# Patient Record
Sex: MALE | Race: WHITE | NOT HISPANIC OR LATINO | Employment: FULL TIME | URBAN - METROPOLITAN AREA
[De-identification: names, ages, dates, MRNs, and addresses within clinical notes are randomized per-mention and may not be internally consistent; named-entity substitution may affect disease eponyms.]

---

## 2024-05-14 ENCOUNTER — APPOINTMENT (OUTPATIENT)
Dept: CARDIOLOGY | Facility: HOSPITAL | Age: 34
End: 2024-05-14
Payer: COMMERCIAL

## 2024-05-14 ENCOUNTER — HOSPITAL ENCOUNTER (EMERGENCY)
Facility: HOSPITAL | Age: 34
Discharge: HOME | End: 2024-05-14
Attending: EMERGENCY MEDICINE
Payer: COMMERCIAL

## 2024-05-14 VITALS
DIASTOLIC BLOOD PRESSURE: 109 MMHG | SYSTOLIC BLOOD PRESSURE: 184 MMHG | HEIGHT: 71 IN | WEIGHT: 220 LBS | TEMPERATURE: 98.2 F | HEART RATE: 86 BPM | OXYGEN SATURATION: 97 % | RESPIRATION RATE: 18 BRPM | BODY MASS INDEX: 30.8 KG/M2

## 2024-05-14 DIAGNOSIS — R45.851 SUICIDAL IDEATION: Primary | ICD-10-CM

## 2024-05-14 LAB
ALBUMIN SERPL BCP-MCNC: 4.6 G/DL (ref 3.4–5)
ALP SERPL-CCNC: 67 U/L (ref 33–120)
ALT SERPL W P-5'-P-CCNC: 16 U/L (ref 10–52)
ANION GAP SERPL CALC-SCNC: 12 MMOL/L (ref 10–20)
APAP SERPL-MCNC: <10 UG/ML
AST SERPL W P-5'-P-CCNC: 16 U/L (ref 9–39)
BASOPHILS # BLD AUTO: 0.02 X10*3/UL (ref 0–0.1)
BASOPHILS NFR BLD AUTO: 0.3 %
BILIRUB SERPL-MCNC: 0.4 MG/DL (ref 0–1.2)
BUN SERPL-MCNC: 13 MG/DL (ref 6–23)
CALCIUM SERPL-MCNC: 9.5 MG/DL (ref 8.6–10.3)
CHLORIDE SERPL-SCNC: 104 MMOL/L (ref 98–107)
CO2 SERPL-SCNC: 26 MMOL/L (ref 21–32)
CREAT SERPL-MCNC: 0.66 MG/DL (ref 0.5–1.3)
EGFRCR SERPLBLD CKD-EPI 2021: >90 ML/MIN/1.73M*2
EOSINOPHIL # BLD AUTO: 0.04 X10*3/UL (ref 0–0.7)
EOSINOPHIL NFR BLD AUTO: 0.7 %
ERYTHROCYTE [DISTWIDTH] IN BLOOD BY AUTOMATED COUNT: 12.6 % (ref 11.5–14.5)
ETHANOL SERPL-MCNC: <10 MG/DL
GLUCOSE SERPL-MCNC: 100 MG/DL (ref 74–99)
HCT VFR BLD AUTO: 45.1 % (ref 41–52)
HGB BLD-MCNC: 15.3 G/DL (ref 13.5–17.5)
IMM GRANULOCYTES # BLD AUTO: 0.05 X10*3/UL (ref 0–0.7)
IMM GRANULOCYTES NFR BLD AUTO: 0.8 % (ref 0–0.9)
LYMPHOCYTES # BLD AUTO: 1.75 X10*3/UL (ref 1.2–4.8)
LYMPHOCYTES NFR BLD AUTO: 28.7 %
MCH RBC QN AUTO: 31.5 PG (ref 26–34)
MCHC RBC AUTO-ENTMCNC: 33.9 G/DL (ref 32–36)
MCV RBC AUTO: 93 FL (ref 80–100)
MONOCYTES # BLD AUTO: 0.61 X10*3/UL (ref 0.1–1)
MONOCYTES NFR BLD AUTO: 10 %
NEUTROPHILS # BLD AUTO: 3.62 X10*3/UL (ref 1.2–7.7)
NEUTROPHILS NFR BLD AUTO: 59.5 %
NRBC BLD-RTO: 0 /100 WBCS (ref 0–0)
PLATELET # BLD AUTO: 294 X10*3/UL (ref 150–450)
POTASSIUM SERPL-SCNC: 3.9 MMOL/L (ref 3.5–5.3)
PROT SERPL-MCNC: 7.4 G/DL (ref 6.4–8.2)
RBC # BLD AUTO: 4.85 X10*6/UL (ref 4.5–5.9)
SALICYLATES SERPL-MCNC: <3 MG/DL
SARS-COV-2 RNA RESP QL NAA+PROBE: NOT DETECTED
SODIUM SERPL-SCNC: 138 MMOL/L (ref 136–145)
WBC # BLD AUTO: 6.1 X10*3/UL (ref 4.4–11.3)

## 2024-05-14 PROCEDURE — 99285 EMERGENCY DEPT VISIT HI MDM: CPT | Mod: 25

## 2024-05-14 PROCEDURE — 84460 ALANINE AMINO (ALT) (SGPT): CPT | Performed by: EMERGENCY MEDICINE

## 2024-05-14 PROCEDURE — 93005 ELECTROCARDIOGRAM TRACING: CPT

## 2024-05-14 PROCEDURE — 36415 COLL VENOUS BLD VENIPUNCTURE: CPT | Performed by: EMERGENCY MEDICINE

## 2024-05-14 PROCEDURE — 85025 COMPLETE CBC W/AUTO DIFF WBC: CPT | Performed by: EMERGENCY MEDICINE

## 2024-05-14 PROCEDURE — 93010 ELECTROCARDIOGRAM REPORT: CPT | Performed by: EMERGENCY MEDICINE

## 2024-05-14 PROCEDURE — 80143 DRUG ASSAY ACETAMINOPHEN: CPT | Performed by: EMERGENCY MEDICINE

## 2024-05-14 PROCEDURE — 99285 EMERGENCY DEPT VISIT HI MDM: CPT | Performed by: EMERGENCY MEDICINE

## 2024-05-14 PROCEDURE — 87635 SARS-COV-2 COVID-19 AMP PRB: CPT | Performed by: EMERGENCY MEDICINE

## 2024-05-14 RX ORDER — HYDROXYZINE HYDROCHLORIDE 25 MG/1
25 TABLET, FILM COATED ORAL EVERY 6 HOURS PRN
Qty: 12 TABLET | Refills: 0 | Status: SHIPPED | OUTPATIENT
Start: 2024-05-14 | End: 2024-05-17

## 2024-05-14 SDOH — HEALTH STABILITY: MENTAL HEALTH: NEEDS EXPRESSED: DENIES

## 2024-05-14 SDOH — HEALTH STABILITY: MENTAL HEALTH: ACTIVE SUICIDAL IDEATION WITH SOME INTENT TO ACT, WITHOUT SPECIFIC PLAN (PAST 1 MONTH): NO

## 2024-05-14 SDOH — HEALTH STABILITY: MENTAL HEALTH: HAVE YOU ACTUALLY HAD ANY THOUGHTS OF KILLING YOURSELF?: YES

## 2024-05-14 SDOH — HEALTH STABILITY: MENTAL HEALTH
HAVE YOU STARTED TO WORK OUT OR WORKED OUT THE DETAILS OF HOW TO KILL YOURSELF? DO YOU INTENT TO CARRY OUT THIS PLAN?: NO

## 2024-05-14 SDOH — HEALTH STABILITY: MENTAL HEALTH: HAVE YOU HAD THESE THOUGHTS AND HAD SOME INTENTION OF ACTING ON THEM?: NO

## 2024-05-14 SDOH — HEALTH STABILITY: MENTAL HEALTH: IN THE PAST FEW WEEKS, HAVE YOU WISHED YOU WERE DEAD?: YES

## 2024-05-14 SDOH — HEALTH STABILITY: MENTAL HEALTH: HAVE YOU BEEN THINKING ABOUT HOW YOU MIGHT DO THIS?: NO

## 2024-05-14 SDOH — HEALTH STABILITY: MENTAL HEALTH: ACTIVE SUICIDAL IDEATION WITH SPECIFIC PLAN AND INTENT (PAST 1 MONTH): NO

## 2024-05-14 SDOH — HEALTH STABILITY: MENTAL HEALTH: HAVE YOU WISHED YOU WERE DEAD OR WISHED YOU COULD GO TO SLEEP AND NOT WAKE UP?: YES

## 2024-05-14 SDOH — SOCIAL STABILITY: SOCIAL NETWORK: EMOTIONAL SUPPORT GIVEN: REASSURE

## 2024-05-14 SDOH — HEALTH STABILITY: MENTAL HEALTH: ANXIETY SYMPTOMS: GENERALIZED;PANIC ATTACK

## 2024-05-14 SDOH — HEALTH STABILITY: MENTAL HEALTH: IN THE PAST FEW WEEKS, HAVE YOU FELT THAT YOU OR YOUR FAMILY WOULD BE BETTER OFF IF YOU WERE DEAD?: YES

## 2024-05-14 SDOH — HEALTH STABILITY: MENTAL HEALTH: NON-SPECIFIC ACTIVE SUICIDAL THOUGHTS (PAST 1 MONTH): YES

## 2024-05-14 SDOH — HEALTH STABILITY: MENTAL HEALTH: BEHAVIORS/MOOD: FLAT AFFECT

## 2024-05-14 SDOH — HEALTH STABILITY: MENTAL HEALTH: SUICIDAL BEHAVIOR (LIFETIME): NO

## 2024-05-14 SDOH — HEALTH STABILITY: MENTAL HEALTH: DEPRESSION SYMPTOMS: FEELINGS OF HELPLESSNESS

## 2024-05-14 SDOH — HEALTH STABILITY: MENTAL HEALTH: IN THE PAST WEEK, HAVE YOU BEEN HAVING THOUGHTS ABOUT KILLING YOURSELF?: NO

## 2024-05-14 SDOH — ECONOMIC STABILITY: HOUSING INSECURITY: FEELS SAFE LIVING IN HOME: YES

## 2024-05-14 SDOH — HEALTH STABILITY: MENTAL HEALTH: HAVE YOU EVER TRIED TO KILL YOURSELF?: NO

## 2024-05-14 SDOH — HEALTH STABILITY: MENTAL HEALTH: ARE YOU HAVING THOUGHTS OF KILLING YOURSELF RIGHT NOW?: NO

## 2024-05-14 SDOH — HEALTH STABILITY: MENTAL HEALTH: WISH TO BE DEAD (PAST 1 MONTH): YES

## 2024-05-14 SDOH — SOCIAL STABILITY: SOCIAL INSECURITY: FAMILY BEHAVIORS: APPROPRIATE FOR SITUATION

## 2024-05-14 SDOH — HEALTH STABILITY: MENTAL HEALTH: HAVE YOU EVER DONE ANYTHING, STARTED TO DO ANYTHING, OR PREPARED TO DO ANYTHING TO END YOUR LIFE?: NO

## 2024-05-14 ASSESSMENT — LIFESTYLE VARIABLES
PRESCIPTION_ABUSE_PAST_12_MONTHS: YES
HAVE YOU EVER FELT YOU SHOULD CUT DOWN ON YOUR DRINKING: NO
EVER HAD A DRINK FIRST THING IN THE MORNING TO STEADY YOUR NERVES TO GET RID OF A HANGOVER: NO
EVER FELT BAD OR GUILTY ABOUT YOUR DRINKING: NO
HAVE PEOPLE ANNOYED YOU BY CRITICIZING YOUR DRINKING: NO
SUBSTANCE_ABUSE_PAST_12_MONTHS: YES
TOTAL SCORE: 0

## 2024-05-14 ASSESSMENT — COLUMBIA-SUICIDE SEVERITY RATING SCALE - C-SSRS
6. HAVE YOU EVER DONE ANYTHING, STARTED TO DO ANYTHING, OR PREPARED TO DO ANYTHING TO END YOUR LIFE?: NO
4. HAVE YOU HAD THESE THOUGHTS AND HAD SOME INTENTION OF ACTING ON THEM?: NO
5. HAVE YOU STARTED TO WORK OUT OR WORKED OUT THE DETAILS OF HOW TO KILL YOURSELF? DO YOU INTEND TO CARRY OUT THIS PLAN?: NO
2. HAVE YOU ACTUALLY HAD ANY THOUGHTS OF KILLING YOURSELF?: YES
1. IN THE PAST MONTH, HAVE YOU WISHED YOU WERE DEAD OR WISHED YOU COULD GO TO SLEEP AND NOT WAKE UP?: YES

## 2024-05-14 ASSESSMENT — PAIN SCALES - GENERAL: PAINLEVEL_OUTOF10: 0 - NO PAIN

## 2024-05-14 ASSESSMENT — PAIN - FUNCTIONAL ASSESSMENT: PAIN_FUNCTIONAL_ASSESSMENT: 0-10

## 2024-05-14 NOTE — DISCHARGE INSTRUCTIONS
Seek immediate medical attention if you develop:  Worsening thoughts of hurting or killing yourself, thoughts of hurting or killing someone else, worsening depression, chest pain, shortness of breath, weakness, or any new or worsening symptoms.    You have been provided outpatient resources by our mental health group.

## 2024-05-14 NOTE — PROGRESS NOTES

## 2024-05-14 NOTE — ED PROVIDER NOTES
"EMERGENCY DEPARTMENT ENCOUNTER      Pt Name: Yared Rai  MRN: [id number]  Birthdate 1990  Date of evaluation: 5/14/2024  Provider: Junior Jacques DO    CHIEF COMPLAINT       Chief Complaint   Patient presents with    Suicidal     Pt has suicidal thoughts no plan     HISTORY OF PRESENT ILLNESS    Yared Rai is a 33 y.o. year old male who presents to the ER for suicidal ideation.  States today he was going to kill himself in his bedroom.  No current plan he \"but I would figure it out \".  States that he is suicidal because he has too much stress in life.  No prior suicide attempts, no homicidal ideation, no auditory visual hallucination.  Denies fever, fainting, chest pain, dyspnea, nausea, vomiting, abdominal pain, changes to urine or stool.  PMH none  PSH right ankle  NKDA  Endorses tobacco alcohol and marijuana use     PAST MEDICAL HISTORY   History reviewed. No pertinent past medical history.  CURRENT MEDICATIONS       Discharge Medication List as of 5/14/2024  2:19 PM        SURGICAL HISTORY     History reviewed. No pertinent surgical history.  ALLERGIES     Pertussis vaccines and Sutures  FAMILY HISTORY     No family history on file.  SOCIAL HISTORY       Social History     Tobacco Use    Smoking status: Former     Types: Cigarettes    Smokeless tobacco: Never   Vaping Use    Vaping status: Not on file   Substance Use Topics    Alcohol use: Never     Comment: pt states he binges 4 x a week    Drug use: Yes     Types: Marijuana     PHYSICAL EXAM  (up to 7 for level 4, 8 or more for level 5)     ED Triage Vitals [05/14/24 1242]   Temperature Heart Rate Respirations BP   36.8 °C (98.2 °F) 86 18 (!) 184/109      Pulse Ox Temp Source Heart Rate Source Patient Position   97 % Temporal Monitor Sitting      BP Location FiO2 (%)     Right arm --       Physical Exam  Vitals and nursing note reviewed.   Constitutional:       General: He is not in acute distress.     Appearance: Normal appearance. He is not " ill-appearing.   HENT:      Head: Normocephalic and atraumatic. No raccoon eyes, Schrader's sign, contusion or laceration.      Jaw: No trismus or malocclusion.      Right Ear: External ear normal.      Left Ear: External ear normal.      Mouth/Throat:      Mouth: Mucous membranes are moist.      Pharynx: Oropharynx is clear.   Eyes:      Extraocular Movements: Extraocular movements intact.      Pupils: Pupils are equal, round, and reactive to light.   Neck:      Trachea: No tracheal deviation.   Cardiovascular:      Rate and Rhythm: Normal rate and regular rhythm.      Pulses: Normal pulses.   Pulmonary:      Effort: No respiratory distress.      Breath sounds: No wheezing, rhonchi or rales.   Chest:      Chest wall: No tenderness.   Abdominal:      General: Abdomen is flat.      Palpations: Abdomen is soft. There is no mass.      Tenderness: There is no abdominal tenderness.   Musculoskeletal:         General: No tenderness or signs of injury.      Cervical back: Normal range of motion. No tenderness.      Right lower leg: No edema.      Left lower leg: No edema.   Skin:     Coloration: Skin is not jaundiced or pale.      Findings: No petechiae, rash or wound.   Neurological:      General: No focal deficit present.      Mental Status: He is alert.   Psychiatric:         Mood and Affect: Affect is flat.         Speech: Speech normal.         Behavior: Behavior is cooperative.         Thought Content: Thought content is not paranoid. Thought content includes suicidal ideation. Thought content does not include homicidal ideation.         Judgment: Judgment is impulsive and inappropriate.        DIAGNOSTIC RESULTS   LABS:  Labs Reviewed   COMPREHENSIVE METABOLIC PANEL - Abnormal       Result Value    Glucose 100 (*)     Sodium 138      Potassium 3.9      Chloride 104      Bicarbonate 26      Anion Gap 12      Urea Nitrogen 13      Creatinine 0.66      eGFR >90      Calcium 9.5      Albumin 4.6      Alkaline Phosphatase  67      Total Protein 7.4      AST 16      Bilirubin, Total 0.4      ALT 16     ACUTE TOXICOLOGY PANEL, BLOOD - Normal    Acetaminophen <10.0      Salicylate  <3      Alcohol <10     SARS-COV-2 PCR - Normal    Coronavirus 2019, PCR Not Detected      Narrative:     This assay has received FDA Emergency Use Authorization (EUA) and is only authorized for the duration of time that circumstances exist to justify the authorization of the emergency use of in vitro diagnostic tests for the detection of SARS-CoV-2 virus and/or diagnosis of COVID-19 infection under section 564(b)(1) of the Act, 21 U.S.C. 360bbb-3(b)(1). This assay is an in vitro diagnostic nucleic acid amplification test for the qualitative detection of SARS-CoV-2 from nasopharyngeal specimens and has been validated for use at [facility]. Negative results do not preclude COVID-19 infections and should not be used as the sole basis for diagnosis, treatment, or other management decisions.     CBC WITH AUTO DIFFERENTIAL    WBC 6.1      nRBC 0.0      RBC 4.85      Hemoglobin 15.3      Hematocrit 45.1      MCV 93      MCH 31.5      MCHC 33.9      RDW 12.6      Platelets 294      Neutrophils % 59.5      Immature Granulocytes %, Automated 0.8      Lymphocytes % 28.7      Monocytes % 10.0      Eosinophils % 0.7      Basophils % 0.3      Neutrophils Absolute 3.62      Immature Granulocytes Absolute, Automated 0.05      Lymphocytes Absolute 1.75      Monocytes Absolute 0.61      Eosinophils Absolute 0.04      Basophils Absolute 0.02       All other labs were within normal range or not returned as of this dictation.  Imaging  No orders to display      Procedure  Procedures  EMERGENCY DEPARTMENT COURSE/MDM:   Medical Decision Making  =================Attending note===============    The patient was seen by the resident/fellow.  I have personally performed a substantive portion of the encounter.  I have seen and examined the patient; agree with the  "workup, evaluation, MDM,   management and diagnosis.  The care plan has been discussed with the resident; I have reviewed the resident's note and agree with the documented findings.      This is a 33 y.o. male who presents to ER with suicidal thoughts.  He states has been having suicidal thoughts for years.  They are worse when he has stress.  He is never attempted to hurt himself.  He is never followed with a mental health provider.  He has no specific plan.  States that he was feeling increasing stress today and estimated decided to come to the ER.  States has been tired of feeling this way.  He has no actual plan and does not think that he would actually hurt himself today.  He does not want hurt anybody else.  There is no specific trigger.  Heart is regular.  Lungs are clear.  Abdomen is soft and nontender.  Moving extremities times without difficulty.  No thyromegaly.  Patient makes good eye contact and interacts appropriately.    EKG: Normal sinus rhythm with a ventricular rate of 80.  .  QTc 415.  No ST changes.    Workup was unremarkable.    Patient was evaluated by EPAT and they felt that he was safe for discharge.  They did send him referral information to follow-up with a mental health provider as an outpatient.  They did recommend discharging him home with hydroxyzine.  He is to follow-up with primary care and mental health provider.  He is to return to the nearest ER for any new or worsening symptoms.  He is given return precautions.  He is happy with this plan.  He feels safe being discharged home.  I feel safe with him being discharged home.            ==========================================        Vitals:    Vitals:    05/14/24 1242   BP: (!) 184/109   BP Location: Right arm   Patient Position: Sitting   Pulse: 86   Resp: 18   Temp: 36.8 °C (98.2 °F)   TempSrc: Temporal   SpO2: 97%   Weight: 99.8 kg (220 lb)   Height: 1.803 m (5' 11\")     Yared Rai is a male 33 y.o. who presents to the " ER for suicidal ideation without plan. On arrival the patients vital signs were: Afebrile, Reguar HR, Normotensive, Regular RR, and Oxygenating well on room air. History obtained from: patient.  Basic psychiatric workup initiated.  No acute leukocytosis, leukopenia, thrombocytosis, thrombocytopenia, or anemia normoglycemic, no acute electrolyte or hepatorenal abnormality COVID-negative no elevation of alcohol, salicylate, acetaminophen.  Plan for EPAT evaluation  ED Course as of 05/14/24 1630   Tue May 14, 2024   1338 Discussed with EPAT, given no specific plan for suicide, no anorexia, no anhedonia, recommended hydroxyzine PRN and follow up outpatient. Referrals proivded [CB]      ED Course User Index  [CB] Junior Jacques DO         Diagnoses as of 05/14/24 1630   Suicidal ideation       External Records Reviewed: I reviewed recent and relevant outside records including inpatient notes, outpatient records  Prescription Drug Consideration: Hydroxyzine prescribed for anxiety    Shared decision making for disposition  Patient and/or patient´s representative was counseled regarding labs, imaging, likely diagnosis. All questions were answered. Recommendation was made   for discharge home. The patient agreed and was discharged home in stable condition with appropriate relevant educational materials. Return precautions were provided which included Worsening thoughts of hurting or killing yourself, thoughts of hurting or killing someone else, worsening depression, chest pain, shortness of breath, weakness, or any new or worsening symptoms..     ED Medications administered this visit:  Medications - No data to display    New Prescriptions from this visit:    Discharge Medication List as of 5/14/2024  2:19 PM        START taking these medications    Details   hydrOXYzine HCL (Atarax) 25 mg tablet Take 1 tablet (25 mg) by mouth every 6 hours if needed for anxiety (anxiety) for up to 3 days., Starting Tue 5/14/2024, Until  Fri 5/17/2024 at 2359, Normal             Follow-up:  Geovanny Coreas MD  [id number] [facility] [address] [phone]    In 1 day          Final Impression:   1. Suicidal ideation          Please excuse any misspellings or unintended errors related to the Dragon speech recognition software used to dictate this note.    I reviewed the case with the attending ED physician. The attending ED physician agrees with the plan.      Junior Jacques, DO  Resident  05/14/24 [id number]

## 2024-05-14 NOTE — PROGRESS NOTES
"EPAT - Social Work Psychiatric Assessment    Arrival Details  Mode of Arrival: Ambulatory  Admission Source: Home  Admission Type: Voluntary  EPAT Assessment Start Date: 05/14/24  EPAT Assessment Start Time: 1307  Name of : Adan Weekseen    History of Present Illness  Admission Reason: Suicidal Thoughts.  HPI: Patient is a 33  year old male who presented to the ED with increased depression and suicidal thoughts. He reports he does not have a plan or intent and no history of self harm. He stated \" this is the worst it has been\". He is low risk at Triage. A review of his Triage and Provider note was conducted.    SW Readmission Information   Readmission within 30 Days: No    Psychiatric Symptoms  Anxiety Symptoms: Generalized, Panic attack  Depression Symptoms: Feelings of helplessness  Yelitza Symptoms: No problems reported or observed.    Psychosis Symptoms  Hallucination Type: No problems reported or observed.  Delusion Type: No problems reported or observed.    Additional Symptoms - Adult  Generalized Anxiety Disorder: Excessive anxiety/worry, Difficult to control worry  Obsessive Compulsive Disorder: No problems reported or observed.  Panic Attack: No problems reported or observed.  Post Traumatic Stress Disorder: No problems reported or observed.  Delirium: No problems reported or observed.  Review of Symptoms Comments: Please see above    Past Psychiatric History/Meds/Treatments  Past Psychiatric History: Patient has a history of Anxiety. He currently does not have any outpatient providers or a PCP. He denies any history of inpatient stays or past suicide attempts. he denies any substance treatment history.  Past Psychiatric Meds/Treatments: none  Past Violence/Victimization History: Patient denies    Current Mental Health Contacts   Name/Phone Number: none   Last Appointment Date: none  Provider Name/Phone Number: none  Provider Last Appointment Date: none    Support System: " Immediate family, Community    Living Arrangement: House    Home Safety  Feels Safe Living in Home: Yes    Income Information  Employment Status for: Patient  Employment Status: Employed  Income Source: Employed  Shift Worked: First Shift  Income/Expense Information: Other (Comment)    [place] Service/Education History  Current or Previous  Service: None  Education Level: College  History of Learning Problems: No  History of School Behavior Problems: No  School History: see above    Social/Cultural History  Social History: 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  Important Activities: Social    Legal  Legal Concerns: none    Drug Screening  Have you used any substances (canabis, cocaine, heroin, hallucinogens, inhalants, etc.) in the past 12 months?: Yes  Have you used any prescription drugs other than prescribed in the past 12 months?: Yes  Is a toxicology screen needed?: Yes    Stage of Change  Stage of Change: Precontemplation  History of Treatment: Other (Comment) (none)  Type of Treatment Offered: AA/NA meeting resource  Treatment Offered: Declined  Duration of Substance Use: daily  Frequency of Substance Use: daily  Age of First Substance Use: n/a    Psychosocial  Psychosocial (WDL): Exceptions to WDL  Behaviors/Mood: Anxious  Affect: Appropriate to circumstances  Parent/Guardian/Significant Other Involvement: No involvement    Orientation  Orientation Level: Oriented X4    General Appearance  Motor Activity: Unremarkable  Speech Pattern: Other (Comment)  General Attitude: Cooperative  Appearance/Hygiene: Unremarkable    Thought Process  Coherency: Unable to assess  Content: Unremarkable  Delusions: Other (Comment)  Perception: Not altered  Hallucination: None  Judgment/Insight:  (fair)  Confusion: None  Cognition: Appropriate judgement, Appropriate safety awareness, Appropriate attention/concentration, Appropriate for developmental age    Sleep  "Pattern  Sleep Pattern: Other (Comment)    Risk Factors  Self Harm/Suicidal Ideation Plan: Passive Suicidal thoughts no plan.  Previous Self Harm/Suicidal Plans: none  Risk Factors: Male  Description of Thoughts/Ideas Leaving Unit Now: SI no plan    Violence Risk Assessment  Assessment of Violence: None noted  Thoughts of Harm to Others: No    Ability to Assess Risk Screen  Risk Screen - Ability to Assess: Able to be screened  Ask Suicide-Screening Questions  1. In the past few weeks, have you wished you were dead?: Yes  2. In the past few weeks, have you felt that you or your family would be better off if you were dead?: Yes  3. In the past week, have you been having thoughts about killing yourself?: No  4. Have you ever tried to kill yourself?: No  5. Are you having thoughts of killing yourself right now?: No  Calculated Risk Score: Potential Risk  [place] Suicide Severity Rating Scale (Screener/Recent Self-Report)  1. Wish to be Dead (Past 1 Month): Yes  2. Non-Specific Active Suicidal Thoughts (Past 1 Month): Yes  3. Active Suicidal Ideation with any Methods (Not Plan) Without Intent to Act (Past 1 Month): No  4. Active Suicidal Ideation with Some Intent to Act, Without Specific Plan (Past 1 Month): No  5. Active Suicidal Ideation with Specific Plan and Intent (Past 1 Month): No  6. Suicidal Behavior (Lifetime): No  Calculated C-SSRS Risk Score (Lifetime/Recent): Low Risk  Step 1: Risk Factors  Current & Past Psychiatric Dx: Mood disorder, Alcohol/substance abuse disorders  Presenting Symptoms: Anxiety and/or panic  Precipitants/Stressors: Triggering events leading to humiliation, shame, and/or despair (e.g. loss of relationship, financial or health status) (real or anticipated)  Change in Treatment: Non-compliant or not receiving treatment  Access to Lethal Methods : Yes (patient has guns but \"they are locked up with no bullets\".)  Step 2: Protective Factors   Protective Factors Internal: Ability to cope with " "stress, Identifies reasons for living  Protective Factors External: Cultural, spiritual and/or moral attitudes against suicide, Supportive social network or family or friends, Engaged in work or school  Step 3: Suicidal Ideation Intensity  How Many Times Have You Had These Thoughts: Less than once a week  When You Have the Thoughts How Long do They Last : Fleeting - few seconds or minutes  Could/Can You Stop Thinking About Killing Yourself or Wanting to Die if You Want to: Does not attempt to control thoughts  Are There Things - Anyone or Anything - That Stopped You From Wanting to Die or Acting on: Does not apply  What Sort of Reasons Did You Have For Thinking About Wanting to Die or Killing Yourself: Does not apply  Total Score: 2  Step 5: Documentation  Risk Level: Low suicide risk (Patient is low risk. Dr. Dorsey agrees.)    Psychiatric Impression and Plan of Care  Assessment and Plan: The patient is a 33 year old male who drove himself to the ED with increased depression and thoughts of self harm. The patient reports increased stress at home and at work. He explained that his son is autistic and \"it is driving a wedge between by wife and me\" as well as described the stressors of his work. 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 The patient is . Also he reports his father has dementia and he is taking care of his estate which has been \"a burden\". The patient shared he has had thoughts of self harm in the past but \"today kind of scared me\" so he came to the ED. He denies having the thoughts now. He denied having intent or a plan. he shared he \"just need something to calm me so I can get thru this\". He is open to therapy and getting mental health providers. He is future oriented asking about locations of providers and his want to start services stating \" it does not matter where I can pay for it\". The patient was low risk during triage and during the assessment. He " denied any hallucinations and homicidal thoughts. This case was reviewed with DAYNA Cavanaugh who gave a med rec. This was discussed with Dr. Dorsey.  Specific Resources Provided to Patient: [facility], Suicide Hotline and Frontline  CM Notified: no  PHP/IOP Recommended: none  Specific Information Provided for PHP/IOP: none  Plan Comments: discharge with resources    Outcome/Disposition  Patient's Perception of Outcome Achieved: patient is future oriented and help seeking  Assessment, Recommendations and Risk Level Reviewed with: discharge home  Contact Name: Anne-Marie Rai  Contact Number(s): [phone]  Contact Relationship: spouse  EPAT Assessment Completed Date: 05/14/24  EPAT Assessment Completed Time: 1354  Patient Disposition: Home

## 2024-05-17 LAB
ATRIAL RATE: 80 BPM
P AXIS: 30 DEGREES
P OFFSET: 172 MS
P ONSET: 122 MS
PR INTERVAL: 188 MS
Q ONSET: 216 MS
QRS COUNT: 13 BEATS
QRS DURATION: 96 MS
QT INTERVAL: 360 MS
QTC CALCULATION(BAZETT): 415 MS
QTC FREDERICIA: 396 MS
R AXIS: 37 DEGREES
T AXIS: 41 DEGREES
T OFFSET: 396 MS
VENTRICULAR RATE: 80 BPM

## 2024-10-16 ENCOUNTER — OFFICE VISIT (OUTPATIENT)
Dept: URGENT CARE | Age: 34
End: 2024-10-16
Payer: COMMERCIAL

## 2024-10-16 VITALS
TEMPERATURE: 98.3 F | DIASTOLIC BLOOD PRESSURE: 87 MMHG | SYSTOLIC BLOOD PRESSURE: 121 MMHG | WEIGHT: 218 LBS | BODY MASS INDEX: 30.52 KG/M2 | HEART RATE: 75 BPM | HEIGHT: 71 IN | OXYGEN SATURATION: 99 % | RESPIRATION RATE: 16 BRPM

## 2024-10-16 DIAGNOSIS — J32.9 SINOBRONCHITIS: Primary | ICD-10-CM

## 2024-10-16 DIAGNOSIS — J40 SINOBRONCHITIS: Primary | ICD-10-CM

## 2024-10-16 PROCEDURE — 99213 OFFICE O/P EST LOW 20 MIN: CPT | Performed by: FAMILY MEDICINE

## 2024-10-16 RX ORDER — METHYLPREDNISOLONE 4 MG/1
TABLET ORAL
Qty: 21 TABLET | Refills: 0 | Status: SHIPPED | OUTPATIENT
Start: 2024-10-16

## 2024-10-16 RX ORDER — AMOXICILLIN AND CLAVULANATE POTASSIUM 875; 125 MG/1; MG/1
1 TABLET, FILM COATED ORAL 2 TIMES DAILY
Qty: 14 TABLET | Refills: 0 | Status: SHIPPED | OUTPATIENT
Start: 2024-10-16 | End: 2024-10-23

## 2024-10-16 NOTE — PROGRESS NOTES
"Subjective   Patient ID: Yared Rai is a 34 y.o. male. They present today with a chief complaint of Cough (X2 months , is now having sore throat ).    Patient disposition: Home    History of Present Illness  HPI  COVID the past 2 months.  Feels that there is sore throat starting from the cough.  Cough is slightly productive.  Went to another urgent care 1 month ago, was given a Z-Bryce states there is no improvement of symptoms.  Sinus pressure, postnasal drip.  No history of asthma or bronchitis.  2 children at home with colds recently.  No ear pain.  Past Medical History  Allergies as of 10/16/2024 - Reviewed 10/16/2024   Allergen Reaction Noted    Pertussis vaccines Unknown 06/09/2014    Sutures Other 02/10/2021       (Not in a hospital admission)            reports that he has quit smoking. His smoking use included cigarettes. He has never used smokeless tobacco. He reports current drug use. Drug: Marijuana. He reports that he does not drink alcohol.    Review of Systems  As noted in HPI. ROS otherwise negative unless noted.       Objective    Vitals:    10/16/24 0941   BP: 121/87   Pulse: 75   Resp: 16   Temp: 36.8 °C (98.3 °F)   SpO2: 99%   Weight: 98.9 kg (218 lb)   Height: 1.803 m (5' 11\")     No LMP for male patient.    Physical Exam  Constitutional: vital signs reviewed. Well developed, well nourished. patient alert and patient without distress.   Head and Face: Normal and atraumatic.    Ears, Nose, Mouth, and Throat:   Hearing: Normal.  External inspection of nose: Normal.   Lips, teeth, tongue and gums: Normal and well hydrated. External inspection of ears: Normal. Ear canals and TMs: Normal.  Posterior pharynx moist and with post nasal drip.  Nasal mucosa: Congested, inflamed  Neck: No neck mass was observed. Supple. normal muscle tone.   Cardiovascular: Heart rate normal, normal S1 and S2, no gallops, no murmurs and no pericardial rub. Rhythm: Normal.  Pulmonary: No respiratory distress. Palpation of " chest: Normal. Clear bilateral breath sounds.   Lymphatic: No cervical lymphadenopathy  Psych: Normal mood and affect        Procedures    Point of Care Test & Imaging Results from this visit         Diagnostic study results (if any) were reviewed.    Assessment/Plan   Allergies, medications, history, and pertinent labs/EKGs/Imaging reviewed.    Medical Decision Making  See note    Orders and Diagnoses  There are no diagnoses linked to this encounter.    Medical Admin Record      Follow Up Instructions  No follow-ups on file.        Electronically signed by [facility]